# Patient Record
Sex: FEMALE | Race: WHITE | Employment: STUDENT
[De-identification: names, ages, dates, MRNs, and addresses within clinical notes are randomized per-mention and may not be internally consistent; named-entity substitution may affect disease eponyms.]

---

## 2017-07-22 ENCOUNTER — HEALTH MAINTENANCE LETTER (OUTPATIENT)
Age: 52
End: 2017-07-22

## 2019-11-03 ENCOUNTER — HEALTH MAINTENANCE LETTER (OUTPATIENT)
Age: 54
End: 2019-11-03

## 2020-10-12 ENCOUNTER — TRANSFERRED RECORDS (OUTPATIENT)
Dept: PHYSICAL THERAPY | Facility: CLINIC | Age: 55
End: 2020-10-12

## 2020-11-11 ENCOUNTER — THERAPY VISIT (OUTPATIENT)
Dept: PHYSICAL THERAPY | Facility: CLINIC | Age: 55
End: 2020-11-11
Payer: COMMERCIAL

## 2020-11-11 DIAGNOSIS — M54.41 RIGHT-SIDED LOW BACK PAIN WITH RIGHT-SIDED SCIATICA: ICD-10-CM

## 2020-11-11 PROCEDURE — 97162 PT EVAL MOD COMPLEX 30 MIN: CPT | Mod: GP | Performed by: PHYSICAL THERAPIST

## 2020-11-11 PROCEDURE — 97110 THERAPEUTIC EXERCISES: CPT | Mod: GP | Performed by: PHYSICAL THERAPIST

## 2020-11-11 NOTE — PROGRESS NOTES
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 The vesicles were along her right glut - and the sciatic NN appears to have been affected. She had no pain, but now notes buttock pain and hip AROM limitations. Pain level is 3/10 when sitting cross legged. Trunk AROM is WNL's and pain free. Myotomes intact. Negative SLR and Slump. Yolande is very motivated to start a HEP.    The history is provided by the patient. No  was used.   Therapist Generated HPI Evaluation         Affected Side: right buttock and thigh.    This is a new condition.  Occurance: shingles.  Where condition occurred: at home.    Pain is described as aching (tight) and is intermittent.  Pain radiates to:  Gluteals right and thigh right. Pain is the same all the time.  Since onset symptoms are unchanged.  Associated symptoms:  Loss of motion/stiffness. Symptoms are exacerbated by certain positions  and relieved by activity/movement.      Work activity restrictions: student.  Barriers include:  None as reported by patient.    Patient Health History         Pain is reported as 3/10 on pain scale.  General health as reported by patient is good.  Pertinent medical history includes: depression, overweight and thyroid problems.   Red flags:  None as reported by patient.  Medical allergies: adhesives and latex.   Surgeries include:  None.    Current medications:  Thyroid medication and anti-depressants.    Current occupation is student to become a counselor.   Primary job tasks include:  Computer work, driving and prolonged sitting.                                    Objective:  System         Lumbar/SI Evaluation  ROM:  AROM Lumbar: normal (tight piriformis and glut on right)      Lumbar Myotomes:  normal                  Neural Tension/Mobility:        Right side:   SLR w/DF; Slump or SLR  negative.   Lumbar Palpation:    Tenderness  present at Left:    Piriformis                                                                                            Musculoskeletal:        Legs:        ROS    Assessment/Plan:    Patient is a 55 year old female with right sciatica complaints.    Patient has the following significant findings with corresponding treatment plan.                Diagnosis 1:  Right LBP and sciatica  Pain -  manual therapy, self management, education and home program  Decreased ROM/flexibility - manual therapy, therapeutic exercise and home program  Decreased function - therapeutic activities and home program    Therapy Evaluation Codes:   1) History comprised of:   Personal factors that impact the plan of care:      None.    Comorbidity factors that impact the plan of care are:      Depression and thyroid.     Medications impacting care: Anti-depressant and thyroid.  2) Examination of Body Systems comprised of:   Body structures and functions that impact the plan of care:      right butoock .   Activity limitations that impact the plan of care are:      Dressing, Squatting/kneeling and Sleeping.  3) Clinical presentation characteristics are:   Evolving/Changing.  4) Decision-Making    Moderate complexity using standardized patient assessment instrument and/or measureable assessment of functional outcome.  Cumulative Therapy Evaluation is: Moderate complexity.    Previous and current functional limitations:  (See Goal Flow Sheet for this information)    Short term and Long term goals: (See Goal Flow Sheet for this information)     Communication ability:  Patient appears to be able to clearly communicate and understand verbal and written communication and follow directions correctly.  Treatment Explanation - The following has been discussed with the patient:   RX ordered/plan of care  Anticipated outcomes  Possible risks and side effects  This patient would benefit from PT intervention to resume normal activities.   Rehab potential  is good.    Frequency:  1 X week, once daily  Duration:  for 6 weeks  Discharge Plan:  Achieve all LTG.  Independent in home treatment program.  Reach maximal therapeutic benefit.    Please refer to the daily flowsheet for treatment today, total treatment time and time spent performing 1:1 timed codes.

## 2020-11-11 NOTE — LETTER
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 The vesicles were along her right glut - and the sciatic NN appears to have been affected. She had no pain, but now notes buttock pain and hip AROM limitations. Pain level is 3/10 when sitting cross legged. Trunk AROM is WNL's and pain free. Myotomes intact. Negative SLR and Slump. Yloande is very motivated to start a HEP.    The history is provided by the patient. No  was used.   Therapist Generated HPI Evaluation         Affected Side: right buttock and thigh.  This is a new condition.  Occurance: shingles.  Where condition occurred: at home.  Pain is described as aching (tight) and is intermittent.  Pain radiates to:  Gluteals right and thigh right. Pain is the same all the time.  Since onset symptoms are unchanged.  Associated symptoms:  Loss of motion/stiffness. Symptoms are exacerbated by certain positions  and relieved by activity/movement.  Work activity restrictions: student.  Barriers include:  None as reported by patient.    Patient Health History  Pain is reported as 3/10 on pain scale.  General health as reported by patient is good.  Pertinent medical history includes: depression, overweight and thyroid problems.   Red flags:  None as reported by patient.  Medical allergies: adhesives and latex.   Surgeries include:  None.    Current medications:  Thyroid medication and anti-depressants.    Current occupation is student to become a  counselor.   Primary job tasks include:  Computer work, driving and prolonged sitting.       Objective:       Lumbar/SI Evaluation  ROM:  AROM Lumbar: normal (tight piriformis and glut on right)  Lumbar Myotomes:  normal  Neural Tension/Mobility:    Right side:   SLR w/DF; Slump or SLR  negative.   Lumbar Palpation:    Tenderness present at Left:    Piriformis                 Musculoskeletal:        Legs:    Assessment/Plan:    Patient is a 55 year old female with right sciatica complaints.    Patient has the following significant findings with corresponding treatment plan.                Diagnosis 1:  Right LBP and sciatica  Pain -  manual therapy, self management, education and home program  Decreased ROM/flexibility - manual therapy, therapeutic exercise and home program  Decreased function - therapeutic activities and home program    Therapy Evaluation Codes:   1) History comprised of:   Personal factors that impact the plan of care:      None.    Comorbidity factors that impact the plan of care are:      Depression and thyroid.     Medications impacting care: Anti-depressant and thyroid.  2) Examination of Body Systems comprised of:   Body structures and functions that impact the plan of care:      right butoock .   Activity limitations that impact the plan of care are:      Dressing, Squatting/kneeling and Sleeping.  3) Clinical presentation characteristics are:   Evolving/Changing.  4) Decision-Making    Moderate complexity using standardized patient assessment instrument and/or measureable assessment of functional outcome.  Cumulative Therapy Evaluation is: Moderate complexity.    Previous and current functional limitations:  (See Goal Flow Sheet for this information)    Short term and Long term goals: (See Goal Flow Sheet for this information)     Communication ability:  Patient appears to be able to clearly communicate and understand verbal and written communication and follow directions  correctly.  Treatment Explanation - The following has been discussed with the patient:   RX ordered/plan of care  Anticipated outcomes  Possible risks and side effects  This patient would benefit from PT intervention to resume normal activities.   Rehab potential is good.    Frequency:  1 X week, once daily  Duration:  for 6 weeks  Discharge Plan:  Achieve all LTG.  Independent in home treatment program.  Reach maximal therapeutic benefit.    Thank you for your referral.    INQUIRIES  Therapist: Xochitl Ibarra PT, DPT  [address] [address]  Phone: [phone]  Fax: [phone]

## 2020-11-16 ENCOUNTER — HEALTH MAINTENANCE LETTER (OUTPATIENT)
Age: 55
End: 2020-11-16

## 2020-11-16 ENCOUNTER — THERAPY VISIT (OUTPATIENT)
Dept: PHYSICAL THERAPY | Facility: CLINIC | Age: 55
End: 2020-11-16
Payer: COMMERCIAL

## 2020-11-16 DIAGNOSIS — M54.41 RIGHT-SIDED LOW BACK PAIN WITH RIGHT-SIDED SCIATICA: ICD-10-CM

## 2020-11-16 PROCEDURE — 97110 THERAPEUTIC EXERCISES: CPT | Mod: GP | Performed by: PHYSICAL THERAPIST

## 2020-11-24 ENCOUNTER — THERAPY VISIT (OUTPATIENT)
Dept: PHYSICAL THERAPY | Facility: CLINIC | Age: 55
End: 2020-11-24
Payer: COMMERCIAL

## 2020-11-24 DIAGNOSIS — M54.41 RIGHT-SIDED LOW BACK PAIN WITH RIGHT-SIDED SCIATICA: ICD-10-CM

## 2020-11-24 PROCEDURE — 97110 THERAPEUTIC EXERCISES: CPT | Mod: GP | Performed by: PHYSICAL THERAPIST

## 2020-11-24 PROCEDURE — 97035 APP MDLTY 1+ULTRASOUND EA 15: CPT | Mod: GP | Performed by: PHYSICAL THERAPIST

## 2020-12-07 ENCOUNTER — THERAPY VISIT (OUTPATIENT)
Dept: PHYSICAL THERAPY | Facility: CLINIC | Age: 55
End: 2020-12-07
Payer: COMMERCIAL

## 2020-12-07 DIAGNOSIS — M54.41 RIGHT-SIDED LOW BACK PAIN WITH RIGHT-SIDED SCIATICA: ICD-10-CM

## 2020-12-07 PROCEDURE — 97035 APP MDLTY 1+ULTRASOUND EA 15: CPT | Mod: GP | Performed by: PHYSICAL THERAPIST

## 2020-12-07 PROCEDURE — 97110 THERAPEUTIC EXERCISES: CPT | Mod: GP | Performed by: PHYSICAL THERAPIST

## 2020-12-16 ENCOUNTER — THERAPY VISIT (OUTPATIENT)
Dept: PHYSICAL THERAPY | Facility: CLINIC | Age: 55
End: 2020-12-16
Payer: COMMERCIAL

## 2020-12-16 DIAGNOSIS — M54.41 RIGHT-SIDED LOW BACK PAIN WITH RIGHT-SIDED SCIATICA: ICD-10-CM

## 2020-12-16 PROCEDURE — 97035 APP MDLTY 1+ULTRASOUND EA 15: CPT | Mod: GP | Performed by: PHYSICAL THERAPIST

## 2020-12-16 PROCEDURE — 97140 MANUAL THERAPY 1/> REGIONS: CPT | Mod: GP | Performed by: PHYSICAL THERAPIST

## 2020-12-21 ENCOUNTER — THERAPY VISIT (OUTPATIENT)
Dept: PHYSICAL THERAPY | Facility: CLINIC | Age: 55
End: 2020-12-21
Payer: COMMERCIAL

## 2020-12-21 DIAGNOSIS — M54.41 ACUTE RIGHT-SIDED LOW BACK PAIN WITH RIGHT-SIDED SCIATICA: ICD-10-CM

## 2020-12-21 PROCEDURE — 97035 APP MDLTY 1+ULTRASOUND EA 15: CPT | Mod: GP | Performed by: PHYSICAL THERAPIST

## 2020-12-21 PROCEDURE — 97140 MANUAL THERAPY 1/> REGIONS: CPT | Mod: GP | Performed by: PHYSICAL THERAPIST

## 2020-12-21 NOTE — PROGRESS NOTES
Subjective:  HPI  Physical Exam                    Objective:  System    Physical Exam    General     ROS    Assessment/Plan:    PROGRESS  REPORT    Progress reporting period is from 11/11 to 12/21/2020.       SUBJECTIVE   Subjective: Continuing to do well - feels confident with her HEP. Significant improvement with PT   Current Pain level: 1/10.     Initial Pain level: 4/10.   Changes in function:  Yes (See Goal flowsheet attached for changes in current functional level)  Adverse reaction to treatment or activity: None    OBJECTIVE    Objective: normal gait, can lie on the right side     ASSESSMENT/PLAN  Updated problem list and treatment plan: Diagnosis 1:  Right hip pain  Pain -  US, manual therapy, self management, education and home program  Decreased ROM/flexibility - manual therapy, therapeutic exercise and home program  Impaired gait - gait training and home program  Impaired muscle performance - neuro re-education and home program  Decreased function - therapeutic activities  STG/LTGs have been met or progress has been made towards goals:  Yes (See Goal flow sheet completed today.)  Assessment of Progress: The patient's condition is improving.  Self Management Plans:  Patient has been instructed in a home treatment program.  Patient  has been instructed in self management of symptoms.      Recommendations:  Will hold chart x 6 weeks.    Please refer to the daily flowsheet for treatment today, total treatment time and time spent performing 1:1 timed codes.

## 2020-12-21 NOTE — LETTER
[facility]  [address] [address] [place] [id number] [phone]    2020    Re: Yolande Gardner   :   1965  MRN:  [id number]   REFERRING PHYSICIAN:   Shilpa Cadena    [facility]    Date of Initial Evaluation: 2020  Visits:  Rxs Used: 6  Reason for Referral:  Acute right-sided low back pain with right-sided sciatica    EVALUATION SUMMARY    Assessment/Plan:    PROGRESS  REPORT    Progress reporting period is from  to 2020.       SUBJECTIVE   Subjective: Continuing to do well - feels confident with her HEP. Significant improvement with PT   Current Pain level: 1/10.     Initial Pain level: 4/10.   Changes in function:  Yes (See Goal flowsheet attached for changes in current functional level)  Adverse reaction to treatment or activity: None    OBJECTIVE  Objective: normal gait, can lie on the right side     ASSESSMENT/PLAN  Updated problem list and treatment plan: Diagnosis 1:  Right hip pain  Pain -  US, manual therapy, self management, education and home program  Decreased ROM/flexibility - manual therapy, therapeutic exercise and home program  Impaired gait - gait training and home program  Impaired muscle performance - neuro re-education and home program  Decreased function - therapeutic activities  STG/LTGs have been met or progress has been made towards goals:  Yes (See Goal flow sheet completed today.)  Assessment of Progress: The patient's condition is improving.  Self Management Plans:  Patient has been instructed in a home treatment program.  Patient  has been instructed in self management of symptoms.            Recommendations:  Will hold chart x 6 weeks.      Thank you for your referral.    INQUIRIES  Therapist: Meche Ibarra, PT, DPT   [facility]  [address] [place] [id number]  Phone: [phone]  Fax: [phone]

## 2021-02-04 PROBLEM — M54.41 RIGHT-SIDED LOW BACK PAIN WITH RIGHT-SIDED SCIATICA: Status: RESOLVED | Noted: 2020-11-11 | Resolved: 2020-12-21

## 2021-09-18 ENCOUNTER — HEALTH MAINTENANCE LETTER (OUTPATIENT)
Age: 56
End: 2021-09-18

## 2021-11-13 ENCOUNTER — HEALTH MAINTENANCE LETTER (OUTPATIENT)
Age: 56
End: 2021-11-13

## 2022-01-08 ENCOUNTER — HEALTH MAINTENANCE LETTER (OUTPATIENT)
Age: 57
End: 2022-01-08

## 2022-11-20 ENCOUNTER — HEALTH MAINTENANCE LETTER (OUTPATIENT)
Age: 57
End: 2022-11-20

## 2023-04-15 ENCOUNTER — HEALTH MAINTENANCE LETTER (OUTPATIENT)
Age: 58
End: 2023-04-15

## 2023-11-19 ENCOUNTER — HEALTH MAINTENANCE LETTER (OUTPATIENT)
Age: 58
End: 2023-11-19